# Patient Record
Sex: FEMALE | Race: WHITE | NOT HISPANIC OR LATINO | Employment: UNEMPLOYED | URBAN - NONMETROPOLITAN AREA
[De-identification: names, ages, dates, MRNs, and addresses within clinical notes are randomized per-mention and may not be internally consistent; named-entity substitution may affect disease eponyms.]

---

## 2019-01-16 ENCOUNTER — TRANSCRIBE ORDERS (OUTPATIENT)
Dept: MAMMOGRAPHY | Facility: HOSPITAL | Age: 54
End: 2019-01-16

## 2019-01-16 ENCOUNTER — TRANSCRIBE ORDERS (OUTPATIENT)
Dept: CT IMAGING | Facility: HOSPITAL | Age: 54
End: 2019-01-16

## 2019-01-16 DIAGNOSIS — Z12.39 SCREENING FOR BREAST CANCER: Primary | ICD-10-CM

## 2019-01-16 DIAGNOSIS — Z87.891 PERSONAL HISTORY OF TOBACCO USE, PRESENTING HAZARDS TO HEALTH: Primary | ICD-10-CM

## 2019-01-29 ENCOUNTER — TRANSCRIBE ORDERS (OUTPATIENT)
Dept: ULTRASOUND IMAGING | Facility: HOSPITAL | Age: 54
End: 2019-01-29

## 2019-01-29 DIAGNOSIS — N18.9 CHRONIC KIDNEY DISEASE, UNSPECIFIED CKD STAGE: Primary | ICD-10-CM

## 2019-02-04 ENCOUNTER — APPOINTMENT (OUTPATIENT)
Dept: ULTRASOUND IMAGING | Facility: HOSPITAL | Age: 54
End: 2019-02-04

## 2019-02-05 ENCOUNTER — APPOINTMENT (OUTPATIENT)
Dept: ULTRASOUND IMAGING | Facility: HOSPITAL | Age: 54
End: 2019-02-05

## 2019-02-06 ENCOUNTER — HOSPITAL ENCOUNTER (OUTPATIENT)
Dept: ULTRASOUND IMAGING | Facility: HOSPITAL | Age: 54
Discharge: HOME | End: 2019-02-06
Admitting: NURSE PRACTITIONER

## 2019-02-06 DIAGNOSIS — N18.9 CHRONIC KIDNEY DISEASE, UNSPECIFIED CKD STAGE: ICD-10-CM

## 2019-02-06 PROCEDURE — 76775 US EXAM ABDO BACK WALL LIM: CPT

## 2023-05-01 ENCOUNTER — TRANSCRIBE ORDERS (OUTPATIENT)
Dept: ADMINISTRATIVE | Facility: HOSPITAL | Age: 58
End: 2023-05-01

## 2023-05-01 DIAGNOSIS — Z87.891 PERSONAL HISTORY OF TOBACCO USE, PRESENTING HAZARDS TO HEALTH: Primary | ICD-10-CM

## 2023-10-09 ENCOUNTER — OFFICE VISIT (OUTPATIENT)
Dept: PULMONOLOGY | Facility: CLINIC | Age: 58
End: 2023-10-09
Payer: COMMERCIAL

## 2023-10-09 VITALS
DIASTOLIC BLOOD PRESSURE: 74 MMHG | HEIGHT: 65 IN | WEIGHT: 162 LBS | SYSTOLIC BLOOD PRESSURE: 121 MMHG | RESPIRATION RATE: 18 BRPM | HEART RATE: 76 BPM | OXYGEN SATURATION: 95 % | BODY MASS INDEX: 26.99 KG/M2

## 2023-10-09 DIAGNOSIS — F51.5 NIGHTMARES: ICD-10-CM

## 2023-10-09 DIAGNOSIS — G47.33 OBSTRUCTIVE SLEEP APNEA: Primary | ICD-10-CM

## 2023-10-09 DIAGNOSIS — G47.19 EXCESSIVE DAYTIME SLEEPINESS: ICD-10-CM

## 2023-10-09 DIAGNOSIS — R06.83 SNORING: ICD-10-CM

## 2023-10-09 DIAGNOSIS — J44.9 CHRONIC OBSTRUCTIVE PULMONARY DISEASE, UNSPECIFIED COPD TYPE: ICD-10-CM

## 2023-10-09 DIAGNOSIS — F17.210 NICOTINE DEPENDENCE, CIGARETTES, UNCOMPLICATED: ICD-10-CM

## 2023-10-09 DIAGNOSIS — G47.8 SLEEP PARALYSIS: ICD-10-CM

## 2023-10-09 RX ORDER — ALBUTEROL SULFATE 90 UG/1
2 AEROSOL, METERED RESPIRATORY (INHALATION) EVERY 4 HOURS PRN
Qty: 18 G | Refills: 5 | Status: SHIPPED | OUTPATIENT
Start: 2023-10-09

## 2023-10-09 RX ORDER — GABAPENTIN 300 MG/1
1 CAPSULE ORAL 3 TIMES DAILY
COMMUNITY
Start: 2023-09-11

## 2023-10-09 RX ORDER — FEXOFENADINE HCL 60 MG/1
60 TABLET, FILM COATED ORAL DAILY
COMMUNITY

## 2023-10-09 RX ORDER — LANOLIN ALCOHOL/MO/W.PET/CERES
1000 CREAM (GRAM) TOPICAL DAILY
COMMUNITY
Start: 2023-09-11

## 2023-10-09 RX ORDER — LAMOTRIGINE 150 MG/1
TABLET ORAL
COMMUNITY
Start: 2023-09-07

## 2023-10-09 NOTE — PROGRESS NOTES
"  CONSULT NOTE    Requested by:   Carlita Monaco APRN      Chief Complaint   Patient presents with    Sleeping Problem    Consult       Subjective:  Gely Dickey is a 58 y.o. female.   Patient came in today for evaluation of possible sleep apnea. Patient says that for the past few years she snores loudly and has woken up in the middle of the night gasping for breath and sometimes with a choking sensation. Also, the patient's family notes that she has occasional pauses in the breathing.     she feels that she doesn't get restful night sleep and her quality has diminished considerably. she does feel sleepy watching TV and reading a book.      she is complaining of occasional headaches. Patient mentions having occasional nights when she has nightmares. she does have episodes of sleep paralysis, almost weekly.     There is known family history of sleep apnea, in her son.    her Epsworth Sleepiness score was 22 /24.     she works 3rd shift, 4-5 nights per week.     she drinks 2-3 cups/cans of caffeinated drinks per day. Smokes 1 PPD, for about 40 years or so.    she also reports having 1-2 recent episodes of \"bronchitis\", over the past few years.    Patient does have a family history of lung diseases, in her father who had asthma.       The following portions of the patient's history were reviewed and updated as appropriate: allergies, current medications, past family history, past medical history, past social history, and past surgical history.    Review of Systems   HENT:  Positive for sore throat. Negative for sinus pressure and sneezing.    Respiratory:  Negative for cough, chest tightness, shortness of breath and wheezing.    Cardiovascular:  Positive for palpitations. Negative for leg swelling.   Psychiatric/Behavioral:  Positive for sleep disturbance.    All other systems reviewed and are negative.      No past medical history on file.    Social History     Tobacco Use    Smoking status: Every Day     " "Packs/day: 1.00     Years: 42.00     Additional pack years: 0.00     Total pack years: 42.00     Types: Cigarettes    Smokeless tobacco: Not on file   Substance Use Topics    Alcohol use: Not on file         Objective:  Visit Vitals  /74   Pulse 76   Resp 18   Ht 165.1 cm (65\")   Wt 73.5 kg (162 lb)   SpO2 95%   BMI 26.96 kg/mý       Physical Exam  Vitals reviewed.   Constitutional:       Appearance: She is well-developed.   HENT:      Head: Atraumatic.      Mouth/Throat:      Comments: Oropharynx was crowded.  Dentures noted.   Cardiovascular:      Rate and Rhythm: Normal rate and regular rhythm.      Heart sounds: Normal heart sounds. No murmur heard.  Pulmonary:      Effort: Pulmonary effort is normal.      Comments: Somewhat hyperresonant to percussion.  Somewhat decreased air entry.  Mild scattered wheezing noted.   Musculoskeletal:      Comments: Gait was normal.   Neurological:      Mental Status: She is alert and oriented to person, place, and time.         Assessment/Plan:  Diagnoses and all orders for this visit:    1. Obstructive sleep apnea (Primary)  -     Polysomnography 4 or More Parameters; Future    2. Snoring  -     Polysomnography 4 or More Parameters; Future    3. Excessive daytime sleepiness  -     Polysomnography 4 or More Parameters; Future    4. Nightmares  -     Polysomnography 4 or More Parameters; Future    5. Sleep paralysis  -     Polysomnography 4 or More Parameters; Future    6. Chronic obstructive pulmonary disease, unspecified COPD type  -     Complete PFT - Pre & Post Bronchodilator; Future  -     Nitric Oxide  -     Polysomnography 4 or More Parameters; Future    7. Nicotine dependence, cigarettes, uncomplicated  -      CT Chest Low Dose Cancer Screening WO; Future    Other orders  -     mometasone-formoterol (DULERA 200) 200-5 MCG/ACT inhaler; Inhale 2 puffs 2 (Two) Times a Day. Rinse mouth with water after use.  Dispense: 1 each; Refill: 5  -     albuterol sulfate HFA " (Ventolin HFA) 108 (90 Base) MCG/ACT inhaler; Inhale 2 puffs Every 4 (Four) Hours As Needed for Wheezing or Shortness of Air.  Dispense: 18 g; Refill: 5        Return in about 3 months (around 1/9/2024) for Recheck, Sleep study, Imaging, For Kina Ellis), Overbook, ....Also 7 mths w/ Dr. Steele.    DISCUSSION(if any):  I have also reviewed her primary care provider's last note that mentions fatigue and sleep disorder.     ===========================  ===========================    PFTs were ordered for follow up visit. Severe COPD noted.     FeNO level was <5 today.    ===========================  ===========================    Sleep questionnaire was reviewed with the patient    The pathophysiology of sleep apnea was discussed, with her.     We will encourage her to schedule the sleep study soon.     The patient will definitely need to be considered for an in lab study due to dream enactment, nightmares, COPD & sleep paralysis among other reasons.  It should be noted that a home sleep study is likely to underestimate the true AHI and is unable to assess sleep stages and abnormal sleep behaviors etc. The patient has understood.    The patient is agreeable to try CPAP/BiPAP, if needed.     Patient was educated on good sleep hygiene measures and voiced understanding of the same.     Patient was given reading material regarding sleep apnea.    Orders as above.    Patient was given education and demonstration on how to use the medicine.     Side effects, of prescribed medicines, discussed.    Patient was also instructed on compliance and adherence with instructions.     I have discussed the need to quit smoking as soon as possible.    Patient was offered modalities such as Chantix/nicotine patches/Wellbutrin to aid in smoking cessation.    The patient will get back to us regarding the choice, once a decision has been taken.     Patient was given reading material, as appropriate.     The patient belongs to the risk  group for which lung cancer screening has been recommended. We will try to make arrangements for the same and this will be indicated soon. This has been ordered.    Patient was asked to call with any concerns.     Patient will be followed clinically to assess for response to treatment and further recommendations will be made, based on response.      Dictated utilizing Dragon dictation.    This document was electronically signed by Luba Steele MD on 10/09/23 at 15:06 EDT

## 2023-11-16 ENCOUNTER — HOSPITAL ENCOUNTER (OUTPATIENT)
Dept: SLEEP MEDICINE | Facility: HOSPITAL | Age: 58
End: 2023-11-16
Payer: COMMERCIAL

## 2023-11-16 DIAGNOSIS — J44.9 CHRONIC OBSTRUCTIVE PULMONARY DISEASE, UNSPECIFIED COPD TYPE: ICD-10-CM

## 2023-11-16 DIAGNOSIS — G47.8 SLEEP PARALYSIS: ICD-10-CM

## 2023-11-16 DIAGNOSIS — G47.33 OBSTRUCTIVE SLEEP APNEA: ICD-10-CM

## 2023-11-16 DIAGNOSIS — R06.83 SNORING: ICD-10-CM

## 2023-11-16 DIAGNOSIS — G47.19 EXCESSIVE DAYTIME SLEEPINESS: ICD-10-CM

## 2023-11-16 DIAGNOSIS — F51.5 NIGHTMARES: ICD-10-CM

## 2023-11-16 PROCEDURE — 95810 POLYSOM 6/> YRS 4/> PARAM: CPT

## 2023-11-20 ENCOUNTER — TELEPHONE (OUTPATIENT)
Dept: PULMONOLOGY | Facility: CLINIC | Age: 58
End: 2023-11-20

## 2023-11-20 NOTE — TELEPHONE ENCOUNTER
Provider: SAQIB VARGAS     Caller: IRAJ RAYMUNDO      Relationship to Patient: SELF    Phone Number: [phone]     Reason for Call: THE PT MISSED A CALL ABOUT HER SLEEP STUDY RESULTS. SHE WOULD LIKE A CALL BACK.

## 2023-12-28 ENCOUNTER — PATIENT OUTREACH (OUTPATIENT)
Dept: INFUSION THERAPY | Facility: HOSPITAL | Age: 58
End: 2023-12-28
Payer: COMMERCIAL

## 2023-12-28 ENCOUNTER — HOSPITAL ENCOUNTER (OUTPATIENT)
Dept: CT IMAGING | Facility: HOSPITAL | Age: 58
Discharge: HOME | End: 2023-12-28
Admitting: INTERNAL MEDICINE
Payer: COMMERCIAL

## 2023-12-28 DIAGNOSIS — F17.210 NICOTINE DEPENDENCE, CIGARETTES, UNCOMPLICATED: ICD-10-CM

## 2023-12-28 PROCEDURE — 71271 CT THORAX LUNG CANCER SCR C-: CPT

## 2024-01-15 ENCOUNTER — OFFICE VISIT (OUTPATIENT)
Dept: PULMONOLOGY | Facility: CLINIC | Age: 59
End: 2024-01-15
Payer: COMMERCIAL

## 2024-01-15 VITALS
HEART RATE: 77 BPM | DIASTOLIC BLOOD PRESSURE: 82 MMHG | SYSTOLIC BLOOD PRESSURE: 128 MMHG | RESPIRATION RATE: 18 BRPM | BODY MASS INDEX: 22.76 KG/M2 | OXYGEN SATURATION: 96 % | WEIGHT: 136.6 LBS | HEIGHT: 65 IN

## 2024-01-15 DIAGNOSIS — J44.9 CHRONIC OBSTRUCTIVE PULMONARY DISEASE, UNSPECIFIED COPD TYPE: Primary | ICD-10-CM

## 2024-01-15 DIAGNOSIS — G47.26 SHIFT WORK SLEEP DISORDER: ICD-10-CM

## 2024-01-15 DIAGNOSIS — G47.33 OBSTRUCTIVE SLEEP APNEA: ICD-10-CM

## 2024-01-15 DIAGNOSIS — F17.210 NICOTINE DEPENDENCE, CIGARETTES, UNCOMPLICATED: ICD-10-CM

## 2024-01-15 PROCEDURE — 99214 OFFICE O/P EST MOD 30 MIN: CPT | Performed by: NURSE PRACTITIONER

## 2024-01-15 RX ORDER — CETIRIZINE HYDROCHLORIDE 5 MG/1
5 TABLET ORAL
COMMUNITY

## 2024-01-15 RX ORDER — DULOXETIN HYDROCHLORIDE 20 MG/1
20 CAPSULE, DELAYED RELEASE ORAL DAILY
COMMUNITY

## 2024-01-15 RX ORDER — ALBUTEROL SULFATE 90 UG/1
2 AEROSOL, METERED RESPIRATORY (INHALATION) EVERY 4 HOURS PRN
Qty: 18 G | Refills: 5 | Status: SHIPPED | OUTPATIENT
Start: 2024-01-15

## 2024-01-15 NOTE — PROGRESS NOTES
"Chief Complaint   Patient presents with    Breathing Problem    Follow-up         Subjective   Gely Dickey is a 58 y.o. female.   Patient comes today for follow up of shortness of breath and COPD.     Symptoms have been stable since the last clinic visit. Patient reports no recent exacerbations.     Patient is using medications, as prescribed. She takes Dulera daily. She uses ANNALISE once a day upon awakening and if she has to go out in the cold.     Exercise tolerance has also remained stable.     Continues to smoke 1 pack per 2 days.    I reviewed her in lab sleep study and discussed the results today. Overall, she does not have JALEESA with AHI 4 per hour. JALEESA noted in REM sleep with AHI 7.3 per hour.     She works 10 pm to 6 am. She goes to bed b/t 8-9 am. She gets OOB 8 pm if she is working. She states will sleep a lot if she does not have to work. She has no problem going to sleep or staying asleep.     She takes all medication before going to bed except B12 & cymbalta and she takes these when she gets up for the day.       The following portions of the patient's history were reviewed and updated as appropriate: allergies, current medications, past family history, past medical history, past social history, and past surgical history.      Review of Systems   HENT:  Positive for sore throat. Negative for sinus pressure and sneezing.    Respiratory:  Positive for cough, chest tightness and shortness of breath. Negative for wheezing.    Cardiovascular:  Positive for palpitations. Negative for leg swelling.   Psychiatric/Behavioral:  Negative for sleep disturbance.        Objective   Visit Vitals  /82   Pulse 77   Resp 18   Ht 165.1 cm (65\") Comment: pt reported   Wt 62 kg (136 lb 9.6 oz)   SpO2 96%   BMI 22.73 kg/m²         Physical Exam  Vitals reviewed.   HENT:      Head: Atraumatic.      Mouth/Throat:      Mouth: Mucous membranes are moist.   Eyes:      Extraocular Movements: Extraocular movements intact. "   Cardiovascular:      Rate and Rhythm: Normal rate and regular rhythm.   Pulmonary:      Effort: Pulmonary effort is normal. No respiratory distress.      Comments: Somewhat decreased A/E with minimal expiratory wheezing.   Musculoskeletal:      Cervical back: Neck supple.      Comments: Gait normal.    Skin:     General: Skin is warm.   Neurological:      Mental Status: She is alert and oriented to person, place, and time.             Assessment & Plan   Diagnoses and all orders for this visit:    1. Chronic obstructive pulmonary disease, unspecified COPD type (Primary)  -     Overnight Sleep Oximetry Study; Future    2. Nicotine dependence, cigarettes, uncomplicated    3. Obstructive sleep apnea    Other orders  -     Fluticasone-Umeclidin-Vilant (TRELEGY) 100-62.5-25 MCG/ACT inhaler; Inhale 1 puff Daily. Rinse mouth with water after use.  Dispense: 60 each; Refill: 5  -     albuterol sulfate HFA (Ventolin HFA) 108 (90 Base) MCG/ACT inhaler; Inhale 2 puffs Every 4 (Four) Hours As Needed for Wheezing or Shortness of Air.  Dispense: 18 g; Refill: 5           Return for keep appt in May, Overnight P ox.    DISCUSSION (if any):  PFT 10/2023 consistent with severe obstruction.     For symptoms of uncontrolled COPD, I have prescribed Trelegy. She should d/c Dulera. Continue ANNALISE as needed.     Compliance with medications stressed.     Side effects of prescribed medications discussed with the patient    Patient was strongly encouraged to quit smoking as soon as possible.    The patient belongs to the risk group for which lung cancer screening has been recommended. She will need to continue annual LDCT which will be due 12/2024.     Study Result    Narrative & Impression   PROCEDURE: CT CHEST LOW DOSE CANCER SCREENING WO-     HISTORY: Lung cancer screening, >= 20 pk-yr smoking history (Age >=  50y); F17.210-Nicotine dependence, cigarettes, uncomplicated     TECHNIQUE: Axial images were obtained from the thoracic inlet  through  the upper abdomen per the low-dose protocol. Coronal images were  reformatted. DLP 26.48 mGy.cm; CTDI 0.69 mGy     FINDINGS: There is centrilobular emphysema. There is a 4 mm oval nodule  in the left major fissure on image #228 consistent with an intrafissural  lymph node. There is no airspace disease. The heart is normal in size.  The aorta is normal in caliber. There are no pleural or pericardial  effusions. The visualized upper abdomen is unremarkable. Bone windows  reveal no acute osseous abnormalities.     IMPRESSION:  No evidence of neoplasm. Lung RADS category 1.     RECOMMENDATIONS:Annual low-dose chest CT.           Images were reviewed, interpreted, and dictated by Dr. Anibal Hair MD  Transcribed by Mildred Ansari PA-C.     This report was signed and finalized on 12/28/2023 3:12 PM by Anibal Hair MD.          Epsworth Sleepiness score was 22 /24.     Will monitor her symptoms and she may need repeat sleep study in the future.     She may need trial of modafinil/armodafinil is sleepiness continues to be an issue. Shift work likely contributes to sleep issues.    I have ordered overnight pulse oximetry.       Dictated utilizing Dragon dictation.    This document was electronically signed by KELLEY Spear January 15, 2024  10:54 EST

## 2024-01-16 ENCOUNTER — TELEPHONE (OUTPATIENT)
Dept: PULMONOLOGY | Facility: CLINIC | Age: 59
End: 2024-01-16

## 2024-02-06 ENCOUNTER — TELEPHONE (OUTPATIENT)
Dept: PULMONOLOGY | Facility: CLINIC | Age: 59
End: 2024-02-06
Payer: COMMERCIAL

## 2024-02-06 DIAGNOSIS — G47.34 NOCTURNAL HYPOXIA: Primary | ICD-10-CM

## 2024-02-06 NOTE — TELEPHONE ENCOUNTER
I have discussed the results of the patients overnight pulse oximetry results with them, I have informed them that the physician would like for them to start on oxygen @ 2 LPM at night and they have agreed.     The Rx will be sent to DME and patient told to notify our office if they have not been contacted by DME within one week.

## 2024-02-07 DIAGNOSIS — J44.9 CHRONIC OBSTRUCTIVE PULMONARY DISEASE, UNSPECIFIED COPD TYPE: ICD-10-CM

## 2024-02-20 ENCOUNTER — TELEPHONE (OUTPATIENT)
Dept: PULMONOLOGY | Facility: CLINIC | Age: 59
End: 2024-02-20
Payer: COMMERCIAL

## 2024-02-20 NOTE — TELEPHONE ENCOUNTER
Patient called and states she is having chest pain and pain in shoulder that has lasted longer than a week.  Advised patient to contact PCP and/or go to ED.  Patient agreed to course of action-today.

## 2024-03-11 ENCOUNTER — TRANSCRIBE ORDERS (OUTPATIENT)
Dept: ADMINISTRATIVE | Facility: HOSPITAL | Age: 59
End: 2024-03-11
Payer: COMMERCIAL

## 2024-03-11 DIAGNOSIS — Z12.31 ENCOUNTER FOR SCREENING MAMMOGRAM FOR MALIGNANT NEOPLASM OF BREAST: Primary | ICD-10-CM

## 2024-03-18 ENCOUNTER — TELEPHONE (OUTPATIENT)
Dept: SURGERY | Facility: CLINIC | Age: 59
End: 2024-03-18
Payer: COMMERCIAL

## 2024-03-18 NOTE — TELEPHONE ENCOUNTER
S/w Pt regarding a colonoscopy referral we received from her provider KELLEY Rader. Pt stated she wants to wait another YR before she has one done. Pt aware to contact referring provider office when ready .

## 2024-04-15 ENCOUNTER — TRANSCRIBE ORDERS (OUTPATIENT)
Dept: LAB | Facility: HOSPITAL | Age: 59
End: 2024-04-15
Payer: COMMERCIAL

## 2024-04-15 ENCOUNTER — LAB (OUTPATIENT)
Dept: LAB | Facility: HOSPITAL | Age: 59
End: 2024-04-15
Payer: COMMERCIAL

## 2024-04-15 ENCOUNTER — HOSPITAL ENCOUNTER (OUTPATIENT)
Dept: GENERAL RADIOLOGY | Facility: HOSPITAL | Age: 59
Discharge: HOME | End: 2024-04-15
Payer: COMMERCIAL

## 2024-04-15 DIAGNOSIS — R31.1 BENIGN ESSENTIAL MICROSCOPIC HEMATURIA: ICD-10-CM

## 2024-04-15 DIAGNOSIS — R31.1 BENIGN ESSENTIAL MICROSCOPIC HEMATURIA: Primary | ICD-10-CM

## 2024-04-15 LAB
ALBUMIN SERPL-MCNC: 4.4 G/DL (ref 3.5–5.2)
ALBUMIN/GLOB SERPL: 1.9 G/DL
ALP SERPL-CCNC: 85 U/L (ref 39–117)
ALT SERPL W P-5'-P-CCNC: 7 U/L (ref 1–33)
ANION GAP SERPL CALCULATED.3IONS-SCNC: 9 MMOL/L (ref 5–15)
AST SERPL-CCNC: 10 U/L (ref 1–32)
BACTERIA UR QL AUTO: ABNORMAL /HPF
BILIRUB SERPL-MCNC: 0.3 MG/DL (ref 0–1.2)
BILIRUB UR QL STRIP: NEGATIVE
BUN SERPL-MCNC: 7 MG/DL (ref 6–20)
BUN/CREAT SERPL: 6.3 (ref 7–25)
CALCIUM SPEC-SCNC: 9.2 MG/DL (ref 8.6–10.5)
CHLORIDE SERPL-SCNC: 102 MMOL/L (ref 98–107)
CLARITY UR: CLEAR
CO2 SERPL-SCNC: 27 MMOL/L (ref 22–29)
COLOR UR: YELLOW
CREAT SERPL-MCNC: 1.12 MG/DL (ref 0.57–1)
EGFRCR SERPLBLD CKD-EPI 2021: 56.8 ML/MIN/1.73
GLOBULIN UR ELPH-MCNC: 2.3 GM/DL
GLUCOSE SERPL-MCNC: 89 MG/DL (ref 65–99)
GLUCOSE UR STRIP-MCNC: NEGATIVE MG/DL
HGB UR QL STRIP.AUTO: NEGATIVE
HYALINE CASTS UR QL AUTO: ABNORMAL /LPF
KETONES UR QL STRIP: NEGATIVE
LEUKOCYTE ESTERASE UR QL STRIP.AUTO: ABNORMAL
NITRITE UR QL STRIP: NEGATIVE
PH UR STRIP.AUTO: 6.5 [PH] (ref 5–8)
POTASSIUM SERPL-SCNC: 3.1 MMOL/L (ref 3.5–5.2)
PROT SERPL-MCNC: 6.7 G/DL (ref 6–8.5)
PROT UR QL STRIP: NEGATIVE
RBC # UR STRIP: ABNORMAL /HPF
REF LAB TEST METHOD: ABNORMAL
SODIUM SERPL-SCNC: 138 MMOL/L (ref 136–145)
SP GR UR STRIP: 1.02 (ref 1–1.03)
SQUAMOUS #/AREA URNS HPF: ABNORMAL /HPF
UROBILINOGEN UR QL STRIP: ABNORMAL
WBC # UR STRIP: ABNORMAL /HPF

## 2024-04-15 PROCEDURE — 72082 X-RAY EXAM ENTIRE SPI 2/3 VW: CPT

## 2024-04-15 PROCEDURE — 87086 URINE CULTURE/COLONY COUNT: CPT

## 2024-04-15 PROCEDURE — 36415 COLL VENOUS BLD VENIPUNCTURE: CPT

## 2024-04-15 PROCEDURE — 80053 COMPREHEN METABOLIC PANEL: CPT

## 2024-04-15 PROCEDURE — 81001 URINALYSIS AUTO W/SCOPE: CPT

## 2024-04-15 PROCEDURE — 74018 RADEX ABDOMEN 1 VIEW: CPT

## 2024-04-17 LAB — BACTERIA SPEC AEROBE CULT: NORMAL

## 2024-06-06 RX ORDER — ALBUTEROL SULFATE 90 UG/1
AEROSOL, METERED RESPIRATORY (INHALATION)
Qty: 18 G | Refills: 5 | Status: SHIPPED | OUTPATIENT
Start: 2024-06-06

## 2024-10-05 RX ORDER — ALBUTEROL SULFATE 90 UG/1
INHALANT RESPIRATORY (INHALATION)
Qty: 18 G | Refills: 5 | OUTPATIENT
Start: 2024-10-05

## 2024-10-25 RX ORDER — ALBUTEROL SULFATE 90 UG/1
INHALANT RESPIRATORY (INHALATION)
Qty: 18 G | Refills: 0 | Status: SHIPPED | OUTPATIENT
Start: 2024-10-25

## 2024-11-12 RX ORDER — ALBUTEROL SULFATE 90 UG/1
INHALANT RESPIRATORY (INHALATION)
Qty: 18 G | Refills: 0 | Status: SHIPPED | OUTPATIENT
Start: 2024-11-12

## 2024-12-19 RX ORDER — ALBUTEROL SULFATE 90 UG/1
INHALANT RESPIRATORY (INHALATION)
Qty: 18 G | Refills: 0 | OUTPATIENT
Start: 2024-12-19

## 2025-02-24 ENCOUNTER — TRANSCRIBE ORDERS (OUTPATIENT)
Dept: ADMINISTRATIVE | Facility: HOSPITAL | Age: 60
End: 2025-02-24
Payer: COMMERCIAL

## 2025-02-24 DIAGNOSIS — Z12.31 ENCOUNTER FOR SCREENING MAMMOGRAM FOR MALIGNANT NEOPLASM OF BREAST: Primary | ICD-10-CM
